# Patient Record
Sex: FEMALE | Race: WHITE
[De-identification: names, ages, dates, MRNs, and addresses within clinical notes are randomized per-mention and may not be internally consistent; named-entity substitution may affect disease eponyms.]

---

## 2017-02-11 ENCOUNTER — HOSPITAL ENCOUNTER (EMERGENCY)
Dept: HOSPITAL 45 - C.EDB | Age: 62
Discharge: HOME | End: 2017-02-11
Payer: COMMERCIAL

## 2017-02-11 VITALS — SYSTOLIC BLOOD PRESSURE: 121 MMHG | OXYGEN SATURATION: 100 % | DIASTOLIC BLOOD PRESSURE: 64 MMHG | HEART RATE: 92 BPM

## 2017-02-11 VITALS — HEIGHT: 62.99 IN

## 2017-02-11 VITALS — TEMPERATURE: 98.24 F

## 2017-02-11 DIAGNOSIS — S92.354A: Primary | ICD-10-CM
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
## 2017-02-11 DIAGNOSIS — E78.5: ICD-10-CM

## 2017-02-11 DIAGNOSIS — E03.9: ICD-10-CM

## 2017-02-11 NOTE — DIAGNOSTIC IMAGING REPORT
RIGHT FOOT 3 VIEWS



CLINICAL HISTORY: Right foot injury.



FINDINGS: 3 views of the right foot are obtained. No prior studies are available

for comparison at the time of dictation. The skeletal structures are osteopenic.

There is a minimally distracted incomplete fracture through the distal shaft of

the fifth metatarsal with overlying soft tissue edema. No additional fracture is

identified. There is mild degenerative spurring along the dorsal aspect of the

tarsal bones. The joint spaces appear well-maintained. An os navicularis is

incidentally noted.



IMPRESSION: There is a minimally distracted incomplete spiral fracture through

the distal shaft of the fifth metatarsal with overlying soft tissue edema.







Electronically signed by:  Mario Roman M.D.

2/11/2017 12:56 PM



Dictated Date/Time:  2/11/2017 12:55 PM

## 2017-02-11 NOTE — EMERGENCY ROOM VISIT NOTE
ED Visit Note


First contact with patient:  11:54


CHIEF COMPLAINT: Foot pain  





HISTORY OF PRESENT ILLNESS: This 61-year-old female patient presents to the 

emergency department ambulatory complaining of swelling and pain in the right 

foot at rest and worse with weight bearing. The patient states that she was 

standing on a chair last night when she lost balance and fell, injuring her 

right foot.  The fall was not associated with any dizziness or lightheadedness.

  The patient rates the pain as sharp and 9/10. The patient has not taken 

anything at home for relief of the pain. The patient is able to walk, but 

states it is painful to do so. No numbness or weakness. No ankle pain. There 

are no lacerations of the foot. The patient is able to move all of their toes 

and their ankle without pain.  No previous fracture to this foot.





REVIEW OF SYSTEMS: GENERAL: A 6 system review of systems was completed with 

positives and pertinent negatives in the HPI.





ALLERGIES: See EMR





MEDICATIONS: See med list





PMH: Hypothyroidism, hyperlipidemia





SOCIAL HISTORY: The patient lives locally with her .  Nonsmoker, denies 

alcohol use.





PHYSICAL EXAM: Vital Signs: Reviewed Nurse's notes, vital signs stable. GENERAL

: This is a 61-year-old female, in no acute distress, but appears in pain, well-

developed, well-nourished. MUSCULOSKELETAL: There is no visual deformity of the 

right foot. There is no erythema or ecchymosis. There is no warmth. There is 

tenderness and swelling over the dorsolateral aspect of the right foot in the 

area of the fifth metatarsal. There is no tenderness over the lateral or medial 

malleolus.  No tenderness of the tib/fib.  The range of motion of the ankle and 

toes are full.  There is no tenderness over the plantar fascia.  The skin is 

intact and there are no lacerations or puncture wounds. Dorsalis pedis pulse 2+

.  Capillary refill less than 2 seconds. 





RADIOGRAPHIC FINDINGS:








RIGHT FOOT 3 VIEWS





CLINICAL HISTORY: Right foot injury.





FINDINGS: 3 views of the right foot are obtained. No prior studies are available


for comparison at the time of dictation. The skeletal structures are osteopenic.


There is a minimally distracted incomplete fracture through the distal shaft of


the fifth metatarsal with overlying soft tissue edema. No additional fracture is


identified. There is mild degenerative spurring along the dorsal aspect of the


tarsal bones. The joint spaces appear well-maintained. An os navicularis is


incidentally noted.





IMPRESSION: There is a minimally distracted incomplete spiral fracture through


the distal shaft of the fifth metatarsal with overlying soft tissue edema.











EMERGENCY DEPARTMENT COURSE: I examined the patient. An X-ray of the right foot 

was reviewed by myself and radiology and reveals the above fracture.  The 

patient was placed in a fracture boot.  She was given information for 

orthopedic follow-up.  Conservative measures were discussed.  She declined 

analgesics.  The patient verbalized understanding of my assessment and 

treatment plan and was discharged home in good condition.





DIAGNOSIS: Foot pain


Current/Historical Medications


Scheduled


Cholecalciferol (Vitamin D), 5,000 DAILY


Ibandronate Sodium (Ibandronate Sodium), 150 MG PO MONTHLY


Levothyroxine Sodium (Levothyroxine Sodium), 50 MCG PO QAM


Ropinirole Hydrochloride (Requip), 5-15 MG PO HS


Simvastatin (Zocor), 20 MG PO QPM


[natural laxative], 1 TAB PO DAILY


[paroxetine], 50 MG PO DAILY





Scheduled PRN


Oxycodone/Acetaminophen 7.5MG/325MG (Percocet 7.5MG/325MG), 1 TAB PO Q8 hrs PRN 

for Pain





Allergies


Coded Allergies:  


     Amoxicillin (Unverified  Adverse Reaction, Mild, NAUSEA, 2/11/17)


     Clavulanic Acid (Unverified  Adverse Reaction, Mild, NAUSEA, 2/11/17)


     Moxifloxacin (Unverified  Adverse Reaction, Mild, N/V, 2/11/17)


     Sulfamethoxazole w/Trimethoprim (Verified  Adverse Reaction, Unknown, 

NAUSEA/VOMITING, 2/11/17)


Uncoded Allergies:  


     [id number] (Allergy, Mild, NAUSEA, 7/3/15)


     [id number] (Allergy, Mild, NAUSEA, 7/3/15)


     [id number] (Allergy, Mild, N/V, 7/3/15)


     [id number] (Allergy, Unknown, NAUSEA/VOMITING, 7/3/15)





Vital Signs











  Date Time  Temp Pulse Resp B/P Pulse Ox O2 Delivery O2 Flow Rate FiO2


 


2/11/17 13:02  92 20 121/64 100   


 


2/11/17 11:38 36.8 104 18 109/60 94   











Departure Information


Impression





 Primary Impression:  


 Fracture of fifth metatarsal bone of right foot





Dispostion


Home / Self-Care





Condition


GOOD





Referrals


Kumar Rodriguez M.D. (PCP)








Bhargav Bush D.O.





Patient Instructions


My [facility]





Additional Instructions





You have been treated in the Emergency Department for a fracture of your fifth 

metatarsal bone. 





For pain control, you can use the following over-the-counter medicines (if >13 yo):





- Regular strength (325mg/tab) Tylenol (acetaminophen) 2 tabs every 4-6 hours 

as needed. Do not exceed 12 tablets in a 24 hour period. Avoid taking more than 

4 grams (4000 mg) of Tylenol per day. This includes any other sources of 

acetaminophen you may take on a regular basis.





- Regular strength (200 mg/tab) Advil (ibuprofen) 1-2 tabs every 4-6 hours as 

needed. Do not exceed a dose of 3200 mg per day.





If this is a recent injury (<24 hrs), ice can be applied to the area of pain 

for the first 3 days to help decrease pain and inflammation.





You have been provided the number for an Orthopaedic Surgeon. You should call 

this number as soon as possible to establish a follow-up visit from today's 

Emergency Department visit.





Wear the boot until follow-up with orthopedics.





Return to the Emergency Department if your current symptoms worsen despite 

treatment course outlined above, or if you develop any of the following symptoms

: intractable pain despite aforementioned treatment course or new onset of 

numbness or tingling of the foot.





Problem Qualifiers








 Primary Impression:  


 Fracture of fifth metatarsal bone of right foot


 Encounter type:  initial encounter  Fracture type:  closed  Fracture alignment

:  nondisplaced  Qualified Codes:  S92.354A - Nondisplaced fracture of fifth 

metatarsal bone, right foot, initial encounter for closed fracture

## 2017-03-17 ENCOUNTER — HOSPITAL ENCOUNTER (OUTPATIENT)
Dept: HOSPITAL 45 - C.ULTR | Age: 62
Discharge: HOME | End: 2017-03-17
Attending: INTERNAL MEDICINE
Payer: COMMERCIAL

## 2017-03-17 DIAGNOSIS — M79.606: Primary | ICD-10-CM

## 2017-03-17 NOTE — DIAGNOSTIC IMAGING REPORT
Venous Doppler left leg VENOUS DOPP LOWER EXT UNILAT



CLINICAL HISTORY: LIMB PAIN edema



TECHNIQUE: Venous Doppler



COMPARISON STUDY:  None



FINDINGS: Normal study



IMPRESSION:  Normal study 









Electronically signed by:  Eugenio Wood M.D.

3/17/2017 4:43 PM



Dictated Date/Time:  3/17/2017 4:42 PM

## 2017-03-22 ENCOUNTER — HOSPITAL ENCOUNTER (OUTPATIENT)
Dept: HOSPITAL 45 - C.LABBFT | Age: 62
Discharge: HOME | End: 2017-03-22
Attending: INTERNAL MEDICINE
Payer: COMMERCIAL

## 2017-03-22 DIAGNOSIS — R60.0: Primary | ICD-10-CM

## 2017-03-22 LAB — TSH SERPL-ACNC: < 0.005 UIU/ML (ref 0.3–4.5)

## 2017-04-07 ENCOUNTER — HOSPITAL ENCOUNTER (OUTPATIENT)
Dept: HOSPITAL 45 - C.LABBFT | Age: 62
Discharge: HOME | End: 2017-04-07
Attending: PHYSICIAN ASSISTANT
Payer: COMMERCIAL

## 2017-04-07 DIAGNOSIS — R60.0: Primary | ICD-10-CM

## 2017-04-07 LAB
ALBUMIN/GLOB SERPL: 1.1 {RATIO} (ref 0.9–2)
ALP SERPL-CCNC: 63 U/L (ref 45–117)
ALT SERPL-CCNC: 21 U/L (ref 12–78)
ANION GAP SERPL CALC-SCNC: 9 MMOL/L (ref 3–11)
AST SERPL-CCNC: 18 U/L (ref 15–37)
BASOPHILS # BLD: 0.01 K/UL (ref 0–0.2)
BASOPHILS NFR BLD: 0.1 %
BUN SERPL-MCNC: 11 MG/DL (ref 7–18)
BUN/CREAT SERPL: 15.3 (ref 10–20)
CALCIUM SERPL-MCNC: 9 MG/DL (ref 8.5–10.1)
CHLORIDE SERPL-SCNC: 106 MMOL/L (ref 98–107)
CO2 SERPL-SCNC: 28 MMOL/L (ref 21–32)
COMPLETE: YES
CREAT SERPL-MCNC: 0.7 MG/DL (ref 0.6–1.2)
EOSINOPHIL NFR BLD AUTO: 211 K/UL (ref 130–400)
GLOBULIN SER-MCNC: 3.2 GM/DL (ref 2.5–4)
GLUCOSE SERPL-MCNC: 81 MG/DL (ref 70–99)
HCT VFR BLD CALC: 38.6 % (ref 37–47)
IG%: 0.1 %
IMM GRANULOCYTES NFR BLD AUTO: 38.7 %
LYMPHOCYTES # BLD: 2.83 K/UL (ref 1.2–3.4)
MCH RBC QN AUTO: 29.7 PG (ref 25–34)
MCHC RBC AUTO-ENTMCNC: 34.2 G/DL (ref 32–36)
MCV RBC AUTO: 86.7 FL (ref 80–100)
MONOCYTES NFR BLD: 9.6 %
NEUTROPHILS # BLD AUTO: 4.5 %
NEUTROPHILS NFR BLD AUTO: 47 %
PMV BLD AUTO: 10.7 FL (ref 7.4–10.4)
POTASSIUM SERPL-SCNC: 3.6 MMOL/L (ref 3.5–5.1)
RBC # BLD AUTO: 4.45 M/UL (ref 4.2–5.4)
SODIUM SERPL-SCNC: 143 MMOL/L (ref 136–145)
WBC # BLD AUTO: 7.32 K/UL (ref 4.8–10.8)

## 2017-04-13 ENCOUNTER — HOSPITAL ENCOUNTER (OUTPATIENT)
Dept: HOSPITAL 45 - C.ULTR | Age: 62
End: 2017-04-13
Attending: PHYSICIAN ASSISTANT
Payer: COMMERCIAL

## 2017-04-13 DIAGNOSIS — R10.2: Primary | ICD-10-CM

## 2017-04-13 DIAGNOSIS — R60.0: ICD-10-CM

## 2017-04-13 DIAGNOSIS — R19.00: ICD-10-CM

## 2017-04-13 NOTE — DIAGNOSTIC IMAGING REPORT
Ultrasound left thigh LEFT EXTREMITY NONVASCULAR LIMITED



CLINICAL HISTORY: R60.0 Localized edema indentation of left lateral thigh s/p

fall  trauma. Pain. Mass.



TECHNIQUE: Soft tissue ultrasound



COMPARISON STUDY:  None



FINDINGS: Normal ultrasonic evaluation of the soft tissues of the left thigh



IMPRESSION:  Normal study 









Electronically signed by:  Eugenio Wood M.D.

4/13/2017 9:41 AM



Dictated Date/Time:  4/13/2017 9:41 AM

## 2017-04-13 NOTE — DIAGNOSTIC IMAGING REPORT
ULTRASOUND ABDOMEN COMPLETE 



CLINICAL HISTORY: Abdominal edema.



COMPARISON STUDY: Abdominal CT dated 5/4/2015.



TECHNIQUE: Real-time, grayscale, and color flow sonography of the abdomen was

performed. Images are reviewed in the transverse and longitudinal planes.



FINDINGS:



Liver: The liver is mildly enlarged and demonstrates heterogeneously increased

echotexture consistent with hepatic steatosis. There is no intrahepatic biliary

ductal dilatation. The main portal vein is patent.



Gallbladder: There are gallstones identified.. There is no gallbladder wall

thickening or pericholecystic fluid. A sonographic Reynoso's sign is reportedly

absent. The common bile duct measures up to 0.5 cm in diameter.



Pancreas: Visualized portions of the pancreatic head and body are normal in

appearance. The splenic vein is patent.



Spleen: The spleen is normal in size and echotexture, measuring 10.0 cm in

length.



Kidneys: The kidneys images are cortical atrophy and are normal in echotexture.

There is no hydronephrosis. The right kidney measures 10.3 cm in length and the

left kidney measures 10.7 cm in length. A nonobstructing calculus is seen in the

left lower pole.



Abdominal vasculature: Visualized portions of the abdominal aorta are normal in

caliber noting moderate atherosclerotic irregularity.



Ascites: None.





IMPRESSION: 



1. No acute sonographic abnormality is identified.



2. Hepatomegaly and mild hepatic steatosis.



3. Cholelithiasis without sonographic evidence of acute cholecystitis.



4. Nonobstructing left renal calculus.







Electronically signed by:  Mario Roman M.D.

4/13/2017 9:42 AM



Dictated Date/Time:  4/13/2017 9:39 AM

## 2017-04-13 NOTE — DIAGNOSTIC IMAGING REPORT
ULTRASOUND OF THE PELVIS 



CLINICAL HISTORY: Pelvic edema.



COMPARISON STUDY: Pelvic CT dated 5/4/2015.



TECHNIQUE: Real-time, grayscale, and color flow sonography of the pelvis is

performed both transabdominally and endovaginally. Images are reviewed in the

transverse and longitudinal planes.



FINDINGS:



Uterus: The uterus is normal in size and echotexture, measuring 5.6 x 2.2 x 2.7

cm.



Endometrium: The endometrium is normal in appearance, and the endometrial stripe

is normal in thickness measuring up to 0.2 cm.



Ovaries: The right ovary was not identified. The left ovary is normal for age,

measuring 1.3 x 0.9 x 0.7 cm. Normal Doppler waveforms are shown within the left

ovary.



Pelvis: There is no free fluid in the cul-de-sac. No concerning adnexal lesion

is seen.





IMPRESSION: 



1. No acute sonographic abnormality is identified.



2. The uterus and left ovary are normal in appearance. The right ovary was not

visualized.







Electronically signed by:  Mario Roman M.D.

4/13/2017 9:39 AM



Dictated Date/Time:  4/13/2017 9:37 AM

## 2017-04-17 ENCOUNTER — HOSPITAL ENCOUNTER (OUTPATIENT)
Dept: HOSPITAL 45 - C.LABBFT | Age: 62
Discharge: HOME | End: 2017-04-17
Attending: PHYSICIAN ASSISTANT
Payer: COMMERCIAL

## 2017-04-17 DIAGNOSIS — R60.0: Primary | ICD-10-CM

## 2017-04-17 LAB
APPEARANCE UR: CLEAR
BILIRUB UR-MCNC: (no result) MG/DL
COLOR UR: YELLOW
MANUAL MICROSCOPIC REQUIRED?: NO
NITRITE UR QL STRIP: (no result)
PH UR STRIP: 7 [PH] (ref 4.5–7.5)
REVIEW REQ?: NO
SP GR UR STRIP: 1.01 (ref 1–1.03)
URINE EPITHELIAL CELL AUTO: (no result) /LPF (ref 0–5)
UROBILINOGEN UR-MCNC: (no result) MG/DL
ZZUR CULT IF INDIC CLEAN CATCH: NO

## 2017-04-18 ENCOUNTER — HOSPITAL ENCOUNTER (OUTPATIENT)
Dept: HOSPITAL 45 - C.MAMM | Age: 62
Discharge: HOME | End: 2017-04-18
Attending: INTERNAL MEDICINE
Payer: COMMERCIAL

## 2017-04-18 DIAGNOSIS — Z12.31: Primary | ICD-10-CM

## 2017-04-19 NOTE — MAMMOGRAPHY REPORT
BILATERAL DIGITAL SCREENING MAMMOGRAM TOMOSYNTHESIS WITH CAD: 4/18/2017

CLINICAL HISTORY: Routine screening.  Patient has no complaints.  





TECHNIQUE:  Breast tomosynthesis in addition to standard 2D mammography was performed. Current study
 was also evaluated with a Computer Aided Detection (CAD) system.  



COMPARISON: Comparison is made to exams dated:  4/15/2016 mammogram, 12/30/2013 mammogram, 6/15/2012
 mammogram, 6/21/2011 consultation, 6/21/2011 ultrasound, and 6/4/2010 mammogram - [facility].   



BREAST COMPOSITION:  There are scattered areas of fibroglandular density in both breasts.  



FINDINGS:  There is stable asymmetry in the posterior right breast.  Scattered benign rim calcificat
ions bilaterally.  No suspicious mass, architectural distortion or cluster of microcalcifications is
 seen.  



IMPRESSION:  ACR BI-RADS CATEGORY 1: NEGATIVE

There is no mammographic evidence of malignancy. A 1 year screening mammogram is recommended.  The p
atient will receive written notification of the results.  





Approximately 10% of breast cancers are not detected with mammography. A negative mammographic repor
t should not delay biopsy if a clinically suggestive mass is present.



Joan Christina M.D.          

ay/:4/18/2017 17:15:46  



Imaging Technologist: Soo STEVENS(LIANA)([id number]), [facility]

letter sent: Normal 1/2  

BI-RADS Code: ACR BI-RADS Category 1: Negative

## 2017-04-21 ENCOUNTER — HOSPITAL ENCOUNTER (OUTPATIENT)
Dept: HOSPITAL 45 - C.ULTR | Age: 62
End: 2017-04-21
Attending: INTERNAL MEDICINE
Payer: COMMERCIAL

## 2017-04-21 DIAGNOSIS — R60.0: ICD-10-CM

## 2017-04-21 DIAGNOSIS — M79.605: Primary | ICD-10-CM

## 2017-04-21 NOTE — DIAGNOSTIC IMAGING REPORT
ULTRASOUND LEFT VENOUS DOPP LOWER EXT UNILAT 



CLINICAL HISTORY: Left leg pain    



COMPARISON STUDY:  No previous studies for comparison. 



FINDINGS: Real-time and color flow Doppler imaging were performed. Flow was seen

within the femoral, popliteal and calf veins with no intraluminal thrombus

demonstrated. The saphenous vein is patent.



IMPRESSION: No evidence of left lower extremity DVT.







Electronically signed by:  Jordan Penaloza M.D.

4/21/2017 3:33 PM



Dictated Date/Time:  4/21/2017 3:33 PM

## 2017-05-05 ENCOUNTER — HOSPITAL ENCOUNTER (OUTPATIENT)
Dept: HOSPITAL 45 - C.ULTR | Age: 62
Discharge: HOME | End: 2017-05-05
Attending: PHYSICIAN ASSISTANT
Payer: COMMERCIAL

## 2017-05-05 DIAGNOSIS — M71.21: ICD-10-CM

## 2017-05-05 DIAGNOSIS — R60.0: Primary | ICD-10-CM

## 2017-05-05 NOTE — DIAGNOSTIC IMAGING REPORT
ULTRASOUND RIGHT LOWER EXTREMITY VENOUS 



CLINICAL HISTORY: Right lower extremity edema.



COMPARISON STUDY: Right lower extremity venous ultrasound dated 7/8/2011.



TECHNIQUE: Real-time, grayscale, and color Doppler sonography of the deep veins

of the right lower extremity was performed from the inguinal crease to the calf.

Compression and augmentation were utilized. 



FINDINGS: There is no sonographic evidence of deep venous thrombosis identified

in the right lower extremity. The common femoral, superficial femoral, and

popliteal veins are patent and normally compressible. The greater saphenous vein

and the profunda femoris vein at the junction with the common femoral vein are

clear. The visualized calf veins are patent. A popliteal cyst measures 4.7 x 1.0

x 3.1 cm.



IMPRESSION:



1. There is no sonographic evidence of deep venous thrombosis identified in the

right lower extremity.



2. Popliteal cyst.







Electronically signed by:  Mario Roman M.D.

5/5/2017 11:56 AM



Dictated Date/Time:  5/5/2017 11:54 AM

## 2017-05-08 ENCOUNTER — HOSPITAL ENCOUNTER (OUTPATIENT)
Dept: HOSPITAL 45 - C.PAPS | Age: 62
Discharge: HOME | End: 2017-05-08
Attending: PHYSICIAN ASSISTANT
Payer: COMMERCIAL

## 2017-05-08 DIAGNOSIS — Z01.419: Primary | ICD-10-CM

## 2017-05-23 ENCOUNTER — HOSPITAL ENCOUNTER (OUTPATIENT)
Dept: HOSPITAL 45 - C.LABBFT | Age: 62
Discharge: HOME | End: 2017-05-23
Attending: PHYSICIAN ASSISTANT
Payer: COMMERCIAL

## 2017-05-23 DIAGNOSIS — R60.0: Primary | ICD-10-CM

## 2017-05-23 LAB
ANION GAP SERPL CALC-SCNC: 4 MMOL/L (ref 3–11)
BUN SERPL-MCNC: 10 MG/DL (ref 7–18)
BUN/CREAT SERPL: 12.4 (ref 10–20)
CALCIUM SERPL-MCNC: 9.1 MG/DL (ref 8.5–10.1)
CHLORIDE SERPL-SCNC: 108 MMOL/L (ref 98–107)
CO2 SERPL-SCNC: 32 MMOL/L (ref 21–32)
CREAT SERPL-MCNC: 0.82 MG/DL (ref 0.6–1.2)
GLUCOSE SERPL-MCNC: 87 MG/DL (ref 70–99)
POTASSIUM SERPL-SCNC: 4.2 MMOL/L (ref 3.5–5.1)
SODIUM SERPL-SCNC: 144 MMOL/L (ref 136–145)

## 2017-05-30 ENCOUNTER — HOSPITAL ENCOUNTER (OUTPATIENT)
Dept: HOSPITAL 45 - C.LABBFT | Age: 62
Discharge: HOME | End: 2017-05-30
Attending: INTERNAL MEDICINE
Payer: COMMERCIAL

## 2017-05-30 DIAGNOSIS — E78.00: Primary | ICD-10-CM

## 2017-05-30 LAB
ALBUMIN/GLOB SERPL: 1 {RATIO} (ref 0.9–2)
ALP SERPL-CCNC: 68 U/L (ref 45–117)
ALT SERPL-CCNC: 22 U/L (ref 12–78)
ANION GAP SERPL CALC-SCNC: 6 MMOL/L (ref 3–11)
AST SERPL-CCNC: 21 U/L (ref 15–37)
BASOPHILS # BLD: 0.02 K/UL (ref 0–0.2)
BASOPHILS NFR BLD: 0.3 %
BUN SERPL-MCNC: 11 MG/DL (ref 7–18)
BUN/CREAT SERPL: 13.3 (ref 10–20)
CALCIUM SERPL-MCNC: 9.5 MG/DL (ref 8.5–10.1)
CHLORIDE SERPL-SCNC: 108 MMOL/L (ref 98–107)
CHOLEST/HDLC SERPL: 3.1 {RATIO}
CO2 SERPL-SCNC: 28 MMOL/L (ref 21–32)
COMPLETE: YES
CREAT SERPL-MCNC: 0.82 MG/DL (ref 0.6–1.2)
EOSINOPHIL NFR BLD AUTO: 229 K/UL (ref 130–400)
GLOBULIN SER-MCNC: 3.7 GM/DL (ref 2.5–4)
GLUCOSE SERPL-MCNC: 99 MG/DL (ref 70–99)
GLUCOSE UR QL: 47 MG/DL
HCT VFR BLD CALC: 45.3 % (ref 37–47)
IG%: 0.3 %
IMM GRANULOCYTES NFR BLD AUTO: 22.7 %
KETONES UR QL STRIP: 71 MG/DL
LYMPHOCYTES # BLD: 1.76 K/UL (ref 1.2–3.4)
MCH RBC QN AUTO: 27.6 PG (ref 25–34)
MCHC RBC AUTO-ENTMCNC: 30.9 G/DL (ref 32–36)
MCV RBC AUTO: 89.2 FL (ref 80–100)
MONOCYTES NFR BLD: 8.3 %
NEUTROPHILS # BLD AUTO: 3.5 %
NEUTROPHILS NFR BLD AUTO: 64.9 %
NITRITE UR QL STRIP: 142 MG/DL (ref 0–150)
PH UR: 146 MG/DL (ref 0–200)
PMV BLD AUTO: 11.3 FL (ref 7.4–10.4)
POTASSIUM SERPL-SCNC: 4.1 MMOL/L (ref 3.5–5.1)
RBC # BLD AUTO: 5.08 M/UL (ref 4.2–5.4)
SODIUM SERPL-SCNC: 142 MMOL/L (ref 136–145)
TSH SERPL-ACNC: < 0.005 UIU/ML (ref 0.3–4.5)
VERY LOW DENSITY LIPOPROT CALC: 28 MG/DL
WBC # BLD AUTO: 7.75 K/UL (ref 4.8–10.8)

## 2017-06-01 ENCOUNTER — HOSPITAL ENCOUNTER (OUTPATIENT)
Dept: HOSPITAL 45 - C.LABSPEC | Age: 62
Discharge: HOME | End: 2017-06-01
Attending: INTERNAL MEDICINE
Payer: COMMERCIAL

## 2017-06-01 DIAGNOSIS — J44.9: ICD-10-CM

## 2017-06-01 DIAGNOSIS — E78.00: Primary | ICD-10-CM

## 2017-06-01 DIAGNOSIS — M81.0: ICD-10-CM

## 2017-06-01 LAB
APPEARANCE UR: CLEAR
BILIRUB UR-MCNC: (no result) MG/DL
COLOR UR: YELLOW
MANUAL MICROSCOPIC REQUIRED?: NO
MUCOUS THREADS URNS QL MICRO: PRESENT
NITRITE UR QL STRIP: (no result)
PH UR STRIP: 6.5 [PH] (ref 4.5–7.5)
REVIEW REQ?: YES
SP GR UR STRIP: 1.02 (ref 1–1.03)
UROBILINOGEN UR-MCNC: (no result) MG/DL
ZZUR CULT IF INDIC CLEAN CATCH: YES

## 2017-06-21 ENCOUNTER — HOSPITAL ENCOUNTER (OUTPATIENT)
Dept: HOSPITAL 45 - C.CTS | Age: 62
Discharge: HOME | End: 2017-06-21
Attending: INTERNAL MEDICINE
Payer: COMMERCIAL

## 2017-06-21 DIAGNOSIS — Z87.891: ICD-10-CM

## 2017-06-21 DIAGNOSIS — R91.1: Primary | ICD-10-CM

## 2017-06-21 NOTE — DIAGNOSTIC IMAGING REPORT
CT LUNG SCREENING, LOW DOSE WITH COMPUTER-AIDED DETECTION (CAD)





CLINICAL HISTORY: Smoking history. Current smoker.    



COMPARISON STUDY:  Chest CT 5/23/2013. 



CT DOSE: 69.09 mGycm



TECHNIQUE:  Low-dose helical CT was acquired without intravenous contrast from

lung apices to bases and reconstructed at 2.5 mm every 2 mm.  CAD was utilized

for this study. 



FINDINGS:  No pleural effusions. No pneumothorax. Moderate emphysema. A few tiny

subpleural nodular density within the lung apices favor mild scarring. Stable

subcentimeter nodule within the right lower lobe on image 76. Lower thoracic and

lumbar spine fusion hardware. The visualized unenhanced liver, spleen, and

adrenal glands are unremarkable. No mediastinal or hilar lymphadenopathy. The

heart is normal in size.



Nodule 1 Category:  2

Nodule 1 Status:  Baseline

Nodule 1 Description:  Solid 

Nodule 1 Lesion ID:  1

Nodule 1 Slice Number:  66

Nodule 1 Volume (mm3):  49

Nodule 1 Major Axis mm:  4.6

Nodule 1 Minor Axis mm:  2.5





IMPRESSION:  Stable subcentimeter nodule within the right lower lobe as detailed

above which demonstrates greater than 3 year stability. Therefore, this is

considered to be benign. Please refer to the recommendations below for

follow-up.





CAD FINDINGS:



Overall Lung RADS Category:  2

Lung RADS Management Recommendation: Lung-RADS 2: Continue annual screening in

12 months.

Lung RADS Follow Up Date:  2018-06-21

Lung RADS Nodule ID:  1







Electronically signed by:  Martin Trivedi M.D.

6/21/2017 9:10 AM



Dictated Date/Time:  6/21/2017 9:04 AM

## 2017-12-28 ENCOUNTER — HOSPITAL ENCOUNTER (OUTPATIENT)
Dept: HOSPITAL 45 - C.MAMM | Age: 62
Discharge: HOME | End: 2017-12-28
Attending: INTERNAL MEDICINE
Payer: COMMERCIAL

## 2017-12-28 DIAGNOSIS — M81.0: Primary | ICD-10-CM

## 2017-12-28 DIAGNOSIS — M85.89: ICD-10-CM

## 2018-01-22 ENCOUNTER — HOSPITAL ENCOUNTER (OUTPATIENT)
Dept: HOSPITAL 45 - C.LAB1850 | Age: 63
Discharge: HOME | End: 2018-01-22
Attending: INTERNAL MEDICINE
Payer: COMMERCIAL

## 2018-01-22 DIAGNOSIS — N20.0: ICD-10-CM

## 2018-01-22 DIAGNOSIS — S32.010A: Primary | ICD-10-CM

## 2018-01-22 DIAGNOSIS — M81.0: ICD-10-CM
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
## 2018-01-22 LAB
CALCIUM SERPL-MCNC: 9.4 MG/DL (ref 8.5–10.1)
CREAT SERPL-MCNC: 0.7 MG/DL (ref 0.6–1.2)

## 2018-04-19 ENCOUNTER — HOSPITAL ENCOUNTER (OUTPATIENT)
Dept: HOSPITAL 45 - C.MAMM | Age: 63
Discharge: HOME | End: 2018-04-19
Attending: INTERNAL MEDICINE
Payer: COMMERCIAL

## 2018-04-19 DIAGNOSIS — Z12.31: Primary | ICD-10-CM

## 2018-04-19 NOTE — MAMMOGRAPHY REPORT
BILATERAL DIGITAL SCREENING MAMMOGRAM TOMOSYNTHESIS WITH CAD: 4/19/2018

CLINICAL HISTORY: Routine screening.  Patient has no complaints.  





TECHNIQUE:  Breast tomosynthesis in addition to standard 2D mammography was performed. Current study 
was also evaluated with a Computer Aided Detection (CAD) system.  



COMPARISON: Comparison is made to exams dated:  4/18/2017 mammogram, 4/15/2016 mammogram, 12/30/2013 
mammogram, 6/15/2012 mammogram, 6/21/2011 consultation, and 6/21/2011 ultrasound - [facility].   



BREAST COMPOSITION:  There are scattered areas of fibroglandular density in both breasts.  



FINDINGS:  No suspicious masses, calcifications, or areas of architectural distortion are noted in ei
ther breast. There has been no significant interval change compared to prior exams. Scattered bilater
al benign-appearing calcifications are not significantly changed.  



IMPRESSION:  ACR BI-RADS CATEGORY 2: BENIGN

There is no mammographic evidence of malignancy. A 1 year screening mammogram is recommended.  The pa
tient will receive written notification of the results.  





Approximately 10% of breast cancers are not detected with mammography. A negative mammographic report
 should not delay biopsy if a clinically suggestive mass is present.



Candy Payne M.D.          

/:4/19/2018 13:18:52  



Imaging Technologist: Della STEVENS,R, M, [facility]

letter sent: Normal 1/2  

BI-RADS Code: ACR BI-RADS Category 2: Benign